# Patient Record
Sex: FEMALE | Race: WHITE | Employment: UNEMPLOYED | URBAN - METROPOLITAN AREA
[De-identification: names, ages, dates, MRNs, and addresses within clinical notes are randomized per-mention and may not be internally consistent; named-entity substitution may affect disease eponyms.]

---

## 2018-04-09 PROBLEM — F32.1 MODERATE MAJOR DEPRESSION (HCC): Status: ACTIVE | Noted: 2018-04-09

## 2019-05-04 ENCOUNTER — HOSPITAL ENCOUNTER (OUTPATIENT)
Dept: MAMMOGRAPHY | Age: 49
Discharge: HOME | End: 2019-05-04
Attending: FAMILY MEDICINE
Payer: COMMERCIAL

## 2019-05-04 DIAGNOSIS — Z12.31 ENCOUNTER FOR SCREENING MAMMOGRAM FOR BREAST CANCER: ICD-10-CM

## 2019-05-04 PROCEDURE — 77067 SCR MAMMO BI INCL CAD: CPT

## 2019-06-27 PROBLEM — F51.05 INSOMNIA DUE TO OTHER MENTAL DISORDER: Status: ACTIVE | Noted: 2019-06-27

## 2019-06-27 PROBLEM — F99 INSOMNIA DUE TO OTHER MENTAL DISORDER: Status: ACTIVE | Noted: 2019-06-27

## 2019-07-18 PROBLEM — E87.6 HYPOKALEMIA: Status: ACTIVE | Noted: 2019-07-18

## 2019-07-18 PROBLEM — R10.11 COLICKY RUQ ABDOMINAL PAIN: Status: ACTIVE | Noted: 2019-07-18

## 2019-07-18 PROBLEM — R10.9 ACUTE RIGHT FLANK PAIN: Status: ACTIVE | Noted: 2019-07-18

## 2019-08-07 PROBLEM — K76.0 NONALCOHOLIC HEPATOSTEATOSIS: Status: ACTIVE | Noted: 2019-08-07

## 2019-09-24 ENCOUNTER — HOSPITAL ENCOUNTER (OUTPATIENT)
Dept: SURGERY | Age: 49
Discharge: HOME | End: 2019-09-24

## 2019-09-24 VITALS — BODY MASS INDEX: 25.69 KG/M2 | WEIGHT: 145 LBS | HEIGHT: 63 IN

## 2019-09-24 NOTE — PERIOP NOTES
Patient verified name and . Order for consent found in EHR and matches case posting; patient verifies procedure. Type 2 surgery, abbreviated phone assessment complete- very difficult to assess pt's history- pt's thoughts very scattered and slurring words. Pt seemed concerned about the Dr Elby Bernheim show that she was watching and her current supplements that she is taking. Pt stated that she had \"a lot\" going on in her life right now and she wasn't sure she \"could handle the surgery. \" Pt informed that her own health was important and that if she had any questions or concerns about her surgery or about her health, she needed to call the surgeon's office- pt given surgeon's office # and advised to call if she thinks she wants to cancel surgery. The pt kept asking if I \"recommended\" she not have surgery and I refused to give her an answer- I kept referring the patient to call the surgeon's office but I did inform the patient that the surgeon scheduled her for surgery so that was his recommendation at that time. Orders received. Labs per surgeon: none  Labs per anesthesia protocol: no hgb needed per Dr Tim Garcia' anesthesia protocol    Patient answered medical/surgical history questions at their best of ability. All prior to admission medications documented in [facility]. Patient instructed to take the following medications the day of surgery according to anesthesia guidelines with a small sip of water: xanax, synthroid, metoprolol, endocet, phenergan PRN, crestor. Hold all vitamins 7 days prior to surgery and NSAIDS 5 days prior to surgery. Prescription meds to hold: Rx vitamins DOS.      Patient instructed on the following:  Arrive at A Entrance, time of arrival to be called the day before by 1700  NPO after midnight including gum, mints, and ice chips  Responsible adult must drive patient to the hospital, stay during surgery, and patient will need supervision 24 hours after anesthesia  Use antibacterial soap in shower the night before surgery and on the morning of surgery  All piercings must be removed prior to arrival.    Leave all valuables (money and jewelry) at home but bring insurance card and ID on DOS. Do not wear make-up, nail polish, lotions, cologne, perfumes, powders, or oil on skin. Patient teach back successful and patient demonstrates knowledge of instruction.

## 2019-10-09 PROBLEM — K81.1 CHOLECYSTITIS, CHRONIC: Status: ACTIVE | Noted: 2019-10-09

## 2019-10-22 ENCOUNTER — HOSPITAL ENCOUNTER (OUTPATIENT)
Dept: SURGERY | Age: 49
Discharge: HOME | End: 2019-10-22

## 2019-10-25 VITALS — BODY MASS INDEX: 25.69 KG/M2 | HEIGHT: 63 IN | WEIGHT: 145 LBS

## 2019-10-25 NOTE — PERIOP NOTES
Patient verified name and . Order for consent NOT found in EHR and matches case posting; patient verifies procedure. Type 2 surgery, PAT phone assessment complete. Orders NOT received. Labs per surgeon: No orders received at this time. Labs per anesthesia protocol: no hgb required by Dr. Kolton Hurd' anesthesia protocol. Patient was hospitalized 19 through 19 in [place] for seizures secondary to benzodiazepine withdrawal. Patient f/u with PCP, Dr. Madonna Alicia, on 10/9/19. Anesthesia to review PCP note and determine if any additional f/u is needed prior to surgery. Charge nurse to f/u. Patient answered medical/surgical history questions at their best of ability. All prior to admission medications documented in [facility]. Patient instructed to take the following medications the day of surgery according to anesthesia guidelines with a small sip of water: Xanax, Synthroid, Metoprolol, Endocet, and Phenergan PRN. Hold all non-prescribed vitamins/supplements/herbals 7 days prior to surgery and NSAIDS 5 days prior to surgery. Prescription meds to hold: Rx vitamins DOS. Patient instructed on the following:  Arrive at [facility], time of arrival to be called the day before by 1700  NPO after midnight including gum, mints, and ice chips  Responsible adult must drive patient to the hospital, stay during surgery, and patient will need supervision 24 hours after anesthesia  Use anti-bacterial soap in shower the night before surgery and on the morning of surgery  All piercings must be removed prior to arrival.    Leave all valuables (money and jewelry) at home but bring insurance card and ID on       DOS. Do not wear make-up, nail polish, lotions, cologne, perfumes, powders, or oil on skin. Patient teach back successful and patient demonstrates knowledge of instruction.

## 2019-10-28 NOTE — PERIOP NOTES
Called Dr. Sha Aleman (Anesthesiologist). Informed of pt's seizure 9/28/19 due to benzodiazepine (xanax) withdrawal. Informed that pt has had f/u with PCP and is currently taking xanax. Per Dr. Sha Aleman, ok for pt to proceed with surgery. Complete PCP note dated 10/9/19 on chart for reference.

## 2019-10-29 ENCOUNTER — ANESTHESIA (OUTPATIENT)
Dept: SURGERY | Age: 49
End: 2019-10-29
Payer: COMMERCIAL

## 2019-10-29 ENCOUNTER — ANESTHESIA EVENT (OUTPATIENT)
Dept: SURGERY | Age: 49
End: 2019-10-29
Payer: COMMERCIAL

## 2019-10-29 ENCOUNTER — HOSPITAL ENCOUNTER (OUTPATIENT)
Age: 49
Setting detail: OUTPATIENT SURGERY
Discharge: HOME | End: 2019-10-29
Attending: SURGERY | Admitting: SURGERY
Payer: COMMERCIAL

## 2019-10-29 VITALS
TEMPERATURE: 97.3 F | RESPIRATION RATE: 16 BRPM | HEART RATE: 82 BPM | BODY MASS INDEX: 25.66 KG/M2 | DIASTOLIC BLOOD PRESSURE: 68 MMHG | OXYGEN SATURATION: 94 % | HEIGHT: 63 IN | SYSTOLIC BLOOD PRESSURE: 162 MMHG | WEIGHT: 144.8 LBS

## 2019-10-29 DIAGNOSIS — K81.1 CHOLECYSTITIS, CHRONIC: Primary | ICD-10-CM

## 2019-10-29 LAB — HCG UR QL: NEGATIVE

## 2019-10-29 PROCEDURE — 77030037088 HC TUBE ENDOTRACH ORAL NSL COVD-A: Performed by: ANESTHESIOLOGY

## 2019-10-29 PROCEDURE — 77030039425 HC BLD LARYNG TRULITE DISP TELE -A: Performed by: ANESTHESIOLOGY

## 2019-10-29 PROCEDURE — 74011250636 HC RX REV CODE- 250/636: Performed by: NURSE ANESTHETIST, CERTIFIED REGISTERED

## 2019-10-29 PROCEDURE — 74011000250 HC RX REV CODE- 250: Performed by: NURSE ANESTHETIST, CERTIFIED REGISTERED

## 2019-10-29 PROCEDURE — 77030034154 HC SHR COAG HARM ACE J&J -F: Performed by: SURGERY

## 2019-10-29 PROCEDURE — 74011250636 HC RX REV CODE- 250/636: Performed by: ANESTHESIOLOGY

## 2019-10-29 PROCEDURE — 74011250637 HC RX REV CODE- 250/637: Performed by: ANESTHESIOLOGY

## 2019-10-29 PROCEDURE — 74011000250 HC RX REV CODE- 250: Performed by: SURGERY

## 2019-10-29 PROCEDURE — 88304 TISSUE EXAM BY PATHOLOGIST: CPT

## 2019-10-29 PROCEDURE — 76210000016 HC OR PH I REC 1 TO 1.5 HR: Performed by: SURGERY

## 2019-10-29 PROCEDURE — 76210000020 HC REC RM PH II FIRST 0.5 HR: Performed by: SURGERY

## 2019-10-29 PROCEDURE — 81025 URINE PREGNANCY TEST: CPT

## 2019-10-29 PROCEDURE — 74011250636 HC RX REV CODE- 250/636: Performed by: SURGERY

## 2019-10-29 PROCEDURE — 77030008522 HC TBNG INSUF LAPRO STRY -B: Performed by: SURGERY

## 2019-10-29 PROCEDURE — 77030031139 HC SUT VCRL2 J&J -A: Performed by: SURGERY

## 2019-10-29 PROCEDURE — 77030018836 HC SOL IRR NACL ICUM -A: Performed by: SURGERY

## 2019-10-29 PROCEDURE — 77030010513 HC APPL CLP LIG J&J -C: Performed by: SURGERY

## 2019-10-29 PROCEDURE — 77030019940 HC BLNKT HYPOTHRM STRY -B: Performed by: ANESTHESIOLOGY

## 2019-10-29 PROCEDURE — 74011000250 HC RX REV CODE- 250: Performed by: ANESTHESIOLOGY

## 2019-10-29 PROCEDURE — 76060000032 HC ANESTHESIA 0.5 TO 1 HR: Performed by: SURGERY

## 2019-10-29 PROCEDURE — 76010000138 HC OR TIME 0.5 TO 1 HR: Performed by: SURGERY

## 2019-10-29 RX ORDER — OXYCODONE HYDROCHLORIDE 5 MG/1
10 TABLET ORAL
Status: COMPLETED | OUTPATIENT
Start: 2019-10-29 | End: 2019-10-29

## 2019-10-29 RX ORDER — MIDAZOLAM HYDROCHLORIDE 1 MG/ML
2 INJECTION, SOLUTION INTRAMUSCULAR; INTRAVENOUS
Status: DISCONTINUED | OUTPATIENT
Start: 2019-10-29 | End: 2019-10-29 | Stop reason: HOSPADM

## 2019-10-29 RX ORDER — OXYCODONE HYDROCHLORIDE 5 MG/1
5 TABLET ORAL
Status: DISCONTINUED | OUTPATIENT
Start: 2019-10-29 | End: 2019-10-30 | Stop reason: HOSPADM

## 2019-10-29 RX ORDER — ONDANSETRON 2 MG/ML
INJECTION INTRAMUSCULAR; INTRAVENOUS AS NEEDED
Status: DISCONTINUED | OUTPATIENT
Start: 2019-10-29 | End: 2019-10-29 | Stop reason: HOSPADM

## 2019-10-29 RX ORDER — NALOXONE HYDROCHLORIDE 0.4 MG/ML
0.1 INJECTION, SOLUTION INTRAMUSCULAR; INTRAVENOUS; SUBCUTANEOUS AS NEEDED
Status: DISCONTINUED | OUTPATIENT
Start: 2019-10-29 | End: 2019-10-29 | Stop reason: HOSPADM

## 2019-10-29 RX ORDER — KETOROLAC TROMETHAMINE 30 MG/ML
INJECTION, SOLUTION INTRAMUSCULAR; INTRAVENOUS AS NEEDED
Status: DISCONTINUED | OUTPATIENT
Start: 2019-10-29 | End: 2019-10-29 | Stop reason: HOSPADM

## 2019-10-29 RX ORDER — ACETAMINOPHEN 500 MG
500 TABLET ORAL ONCE
Status: DISCONTINUED | OUTPATIENT
Start: 2019-10-29 | End: 2019-10-30 | Stop reason: HOSPADM

## 2019-10-29 RX ORDER — HYDROMORPHONE HYDROCHLORIDE 2 MG/ML
0.5 INJECTION, SOLUTION INTRAMUSCULAR; INTRAVENOUS; SUBCUTANEOUS
Status: DISCONTINUED | OUTPATIENT
Start: 2019-10-29 | End: 2019-10-30 | Stop reason: HOSPADM

## 2019-10-29 RX ORDER — ONDANSETRON 2 MG/ML
4 INJECTION INTRAMUSCULAR; INTRAVENOUS ONCE
Status: DISCONTINUED | OUTPATIENT
Start: 2019-10-29 | End: 2019-10-30 | Stop reason: HOSPADM

## 2019-10-29 RX ORDER — ROCURONIUM BROMIDE 10 MG/ML
INJECTION, SOLUTION INTRAVENOUS AS NEEDED
Status: DISCONTINUED | OUTPATIENT
Start: 2019-10-29 | End: 2019-10-29 | Stop reason: HOSPADM

## 2019-10-29 RX ORDER — FENTANYL CITRATE 50 UG/ML
100 INJECTION, SOLUTION INTRAMUSCULAR; INTRAVENOUS AS NEEDED
Status: CANCELLED | OUTPATIENT
Start: 2019-10-29

## 2019-10-29 RX ORDER — SODIUM CHLORIDE, SODIUM LACTATE, POTASSIUM CHLORIDE, CALCIUM CHLORIDE 600; 310; 30; 20 MG/100ML; MG/100ML; MG/100ML; MG/100ML
75 INJECTION, SOLUTION INTRAVENOUS CONTINUOUS
Status: DISCONTINUED | OUTPATIENT
Start: 2019-10-29 | End: 2019-10-30 | Stop reason: HOSPADM

## 2019-10-29 RX ORDER — CEFAZOLIN SODIUM 1 G/3ML
2-3 INJECTION, POWDER, FOR SOLUTION INTRAMUSCULAR; INTRAVENOUS
Status: CANCELLED | OUTPATIENT
Start: 2019-10-29 | End: 2019-10-29

## 2019-10-29 RX ORDER — DEXAMETHASONE SODIUM PHOSPHATE 4 MG/ML
INJECTION, SOLUTION INTRA-ARTICULAR; INTRALESIONAL; INTRAMUSCULAR; INTRAVENOUS; SOFT TISSUE AS NEEDED
Status: DISCONTINUED | OUTPATIENT
Start: 2019-10-29 | End: 2019-10-29 | Stop reason: HOSPADM

## 2019-10-29 RX ORDER — ONDANSETRON HYDROCHLORIDE 8 MG/1
8 TABLET, FILM COATED ORAL
Qty: 10 TAB | Refills: 0 | Status: SHIPPED | OUTPATIENT
Start: 2019-10-29

## 2019-10-29 RX ORDER — HYDROCODONE BITARTRATE AND ACETAMINOPHEN 5; 325 MG/1; MG/1
TABLET ORAL
Qty: 20 TAB | Refills: 0 | Status: SHIPPED | OUTPATIENT
Start: 2019-10-29 | End: 2019-11-05

## 2019-10-29 RX ORDER — FENTANYL CITRATE 50 UG/ML
INJECTION, SOLUTION INTRAMUSCULAR; INTRAVENOUS AS NEEDED
Status: DISCONTINUED | OUTPATIENT
Start: 2019-10-29 | End: 2019-10-29 | Stop reason: HOSPADM

## 2019-10-29 RX ORDER — CEFAZOLIN SODIUM/WATER 2 G/20 ML
2 SYRINGE (ML) INTRAVENOUS ONCE
Status: COMPLETED | OUTPATIENT
Start: 2019-10-29 | End: 2019-10-29

## 2019-10-29 RX ORDER — PROPOFOL 10 MG/ML
INJECTION, EMULSION INTRAVENOUS AS NEEDED
Status: DISCONTINUED | OUTPATIENT
Start: 2019-10-29 | End: 2019-10-29 | Stop reason: HOSPADM

## 2019-10-29 RX ORDER — DIPHENHYDRAMINE HYDROCHLORIDE 50 MG/ML
12.5 INJECTION, SOLUTION INTRAMUSCULAR; INTRAVENOUS ONCE
Status: DISCONTINUED | OUTPATIENT
Start: 2019-10-29 | End: 2019-10-30 | Stop reason: HOSPADM

## 2019-10-29 RX ORDER — LIDOCAINE HYDROCHLORIDE 20 MG/ML
INJECTION, SOLUTION EPIDURAL; INFILTRATION; INTRACAUDAL; PERINEURAL AS NEEDED
Status: DISCONTINUED | OUTPATIENT
Start: 2019-10-29 | End: 2019-10-29 | Stop reason: HOSPADM

## 2019-10-29 RX ORDER — BUPIVACAINE HYDROCHLORIDE AND EPINEPHRINE 2.5; 5 MG/ML; UG/ML
INJECTION, SOLUTION EPIDURAL; INFILTRATION; INTRACAUDAL; PERINEURAL AS NEEDED
Status: DISCONTINUED | OUTPATIENT
Start: 2019-10-29 | End: 2019-10-29 | Stop reason: HOSPADM

## 2019-10-29 RX ORDER — LIDOCAINE HYDROCHLORIDE 10 MG/ML
0.1 INJECTION INFILTRATION; PERINEURAL AS NEEDED
Status: DISCONTINUED | OUTPATIENT
Start: 2019-10-29 | End: 2019-10-29 | Stop reason: HOSPADM

## 2019-10-29 RX ORDER — SODIUM CHLORIDE, SODIUM LACTATE, POTASSIUM CHLORIDE, CALCIUM CHLORIDE 600; 310; 30; 20 MG/100ML; MG/100ML; MG/100ML; MG/100ML
1000 INJECTION, SOLUTION INTRAVENOUS CONTINUOUS
Status: DISCONTINUED | OUTPATIENT
Start: 2019-10-29 | End: 2019-10-29 | Stop reason: HOSPADM

## 2019-10-29 RX ADMIN — DEXAMETHASONE SODIUM PHOSPHATE 5 MG: 4 INJECTION, SOLUTION INTRAMUSCULAR; INTRAVENOUS at 14:54

## 2019-10-29 RX ADMIN — FENTANYL CITRATE 100 MCG: 50 INJECTION INTRAMUSCULAR; INTRAVENOUS at 14:32

## 2019-10-29 RX ADMIN — FENTANYL CITRATE 50 MCG: 50 INJECTION INTRAMUSCULAR; INTRAVENOUS at 14:52

## 2019-10-29 RX ADMIN — Medication 2 G: at 14:35

## 2019-10-29 RX ADMIN — LIDOCAINE HYDROCHLORIDE 0.1 ML: 10 INJECTION, SOLUTION INFILTRATION; PERINEURAL at 13:01

## 2019-10-29 RX ADMIN — LIDOCAINE HYDROCHLORIDE 80 MG: 20 INJECTION, SOLUTION EPIDURAL; INFILTRATION; INTRACAUDAL; PERINEURAL at 14:32

## 2019-10-29 RX ADMIN — FENTANYL CITRATE 50 MCG: 50 INJECTION INTRAMUSCULAR; INTRAVENOUS at 14:53

## 2019-10-29 RX ADMIN — KETOROLAC TROMETHAMINE 30 MG: 30 INJECTION, SOLUTION INTRAMUSCULAR; INTRAVENOUS at 14:59

## 2019-10-29 RX ADMIN — SUGAMMADEX 200 MG: 100 INJECTION, SOLUTION INTRAVENOUS at 15:07

## 2019-10-29 RX ADMIN — PROPOFOL 200 MG: 10 INJECTION, EMULSION INTRAVENOUS at 14:32

## 2019-10-29 RX ADMIN — ROCURONIUM BROMIDE 30 MG: 10 INJECTION, SOLUTION INTRAVENOUS at 14:32

## 2019-10-29 RX ADMIN — ONDANSETRON 4 MG: 2 INJECTION INTRAMUSCULAR; INTRAVENOUS at 14:54

## 2019-10-29 RX ADMIN — OXYCODONE HYDROCHLORIDE 10 MG: 5 TABLET ORAL at 16:00

## 2019-10-29 RX ADMIN — SODIUM CHLORIDE, SODIUM LACTATE, POTASSIUM CHLORIDE, AND CALCIUM CHLORIDE 1000 ML: 600; 310; 30; 20 INJECTION, SOLUTION INTRAVENOUS at 13:01

## 2019-10-29 NOTE — ANESTHESIA POSTPROCEDURE EVALUATION
Procedure(s):  CHOLECYSTECTOMY LAPAROSCOPIC SINGLE INCISION (SILS).     general    Anesthesia Post Evaluation      Multimodal analgesia: multimodal analgesia used between 6 hours prior to anesthesia start to PACU discharge  Patient location during evaluation: PACU  Patient participation: complete - patient participated  Level of consciousness: awake and alert  Pain management: adequate  Airway patency: patent  Anesthetic complications: no  Cardiovascular status: acceptable  Respiratory status: acceptable  Hydration status: acceptable  Post anesthesia nausea and vomiting:  none      Vitals Value Taken Time   /70 10/29/2019  4:03 PM   Temp 36.3 °C (97.3 °F) 10/29/2019  3:18 PM   Pulse 81 10/29/2019  4:03 PM   Resp 16 10/29/2019  4:03 PM   SpO2 93 % 10/29/2019  4:03 PM

## 2019-10-29 NOTE — ANESTHESIA PREPROCEDURE EVALUATION
Anesthetic History   No history of anesthetic complications            Review of Systems / Medical History  Patient summary reviewed, nursing notes reviewed and pertinent labs reviewed    Pulmonary          Smoker         Neuro/Psych         Psychiatric history (anxiety/depression)     Cardiovascular    Hypertension: well controlled              Exercise tolerance: >4 METS     GI/Hepatic/Renal           Liver disease     Endo/Other      Hypothyroidism: well controlled       Other Findings   Comments: Grave's disease, treated with radioablation, on Synthroid           Physical Exam    Airway  Mallampati: III  TM Distance: 4 - 6 cm  Neck ROM: normal range of motion   Mouth opening: Diminished (comment)     Cardiovascular    Rhythm: regular  Rate: normal         Dental         Pulmonary  Breath sounds clear to auscultation               Abdominal         Other Findings            Anesthetic Plan    ASA: 3  Anesthesia type: general          Induction: Intravenous  Anesthetic plan and risks discussed with: Patient

## 2019-10-29 NOTE — OP NOTES
Operative Report    Patient: Rema Moore MRN: [id number]      YOB: 1970  Age: 52 y.o. Sex: female       Date of Surgery: 10/29/2019     Preoperative Diagnosis: Biliary dyskinesia [K82.8]     Postoperative Diagnosis: Biliary dyskinesia [K82.8     Procedure:  Laparoscopic Cholecystectomy -Single Incision/SILS    Anesthesia: General   Complications: none  Estimated Blood Loss: per anesthesia    Indications:  As outlined in the History and Physical.  Single incision technique is planned to provide the patient with the benefit of less incisional pain and improved cosmesis due to fewer incisions as well as the potential for lower risk of wound infection. This technique is significantly more intricate than standard laparoscopic techniques and typically increases the complexity of the procedure by 10-20%. Procedure in Detail:   Informed consent was obtained and the patient was brought to the operating room and placed on the table in supine position with adequate padding of all pressure points and compression devices on both lower extremities. After the successful induction of general anesthesia, the patients abdomen was prepped and draped sterilely. Under direct laparoscopic visualization and additional local anesthetic, a 5mm optiview-type Trocar was inserted through a curved infraumbilical  incision and pneumoperitoneum was created. Visual exploration revealed no immediately apparent pathology. An additional 5mm Trocar was placed through the incision  and a [place] grasper was inserted alongside that trocar sleeve. The visibly normal gallbladder was grasped by its neck and retracted anterolaterally. The tissues investing this area were incised and blunt dissection was used to skeletonize the normal caliber cystic duct for approximately 10mm.  A generous window was created behind the duct allowing identification of the cystic artery, which was traced to the gallbladder wall and divided with the harmonic scalpel. This permitted  generation of a large window behind the neck of the gallbladder allowing a near 360 degree view of the region to confirm normal anatomy of the origin of the cystic duct. A clip was then placed across the neck of the gallbladder and two on the duct 0.5 cm medially and it was transected. The gallbladder was excised with the harmonic and after confirming hemostasis of the liver bed, it was extracted through the umbilical site. Pneumoperitoneum was released and the trocars were removed. The fascia was closed with a figure-of-eight 0-Vicryl,  then the incision was  made hemostatic with cautery and closed with subcuticular 4-0 Vicryl and Steri-Strips were applied. The patient tolerated the procedure well. There were no immediate apparent complications. She was awakened from anesthesia and extubated in the operating room, taken to recovery in satisfactory condition. Specimens:   ID Type Source Tests Collected by Time Destination   1 : gallbladder Preservative Gallbladder  Lonniebhavya Pak MD 10/29/2019 9436 Pathology           Counts: Sponge and needle counts were correct.     Signed By:  Candace New MD     October 29, 2019

## 2019-10-29 NOTE — DISCHARGE INSTRUCTIONS
Monica Todd M.D.  [phone]    Instructions following Laparoscopic Cholecystectomy:    ACTIVITY:   Try to take a few short walks with help around the house later today. It is very important to take short walks to avoid blood clots and pneumonia.  You may be light-headed or sleepy from anesthesia, so be careful going up and down stairs.  Avoid any activity that may be dangerous or involves heavy objects for 24-48 hours. DIET:   Drink only clear, non-carbonated liquids when you get home (sugar-free if you are diabetic), such as Gatorade, chicken broth, etc.   Tomorrow start soft foods such as grits and eggs, mashed potatoes, yogurt, cottage cheese, etc. Remain on soft foods for at least 24-36 hours then you may eat whatever foods you wish.  Many people experience nausea for a day or so after surgery, and many people have loose stools or diarrhea immediately after gallbladder surgery. It is also not uncommon to not have a bowel movement for 2-3 days. PAIN:   You will be given a prescription for pain medication and nausea.  Try to take the pain medication with food, even a few crackers.  You may also use Tylenol, Motrin, Advil, or Aleve instead of the prescription pain medication. Do no take Tylenol and the prescription pain medication within 4 hours of each other.  URINARY RETENTION: If you are unable to empty your bladder within 6 hours after returning home, please go to your nearest Emergency Department or Urgent Care for urinary catheterization. WOUND CARE:   You may shower the day after surgery   It is not uncommon for the incisions to ooze or drain blood-tinged fluid. Cover the area with gauze if the drainage continues.  Incisions will sometimes develop redness around them, up to the size of a quarter, as well as a hard lumpy feel. If this redness continues to get larger, please call the office.     PLEASE NOTE: it is not uncommon to have pain and a \"knot\" to the left of the belly button. CALL THE DOCTOR IF:   You have a temperature higher than 101.5° Fahrenheit for more than 6 hours.  You have severe nausea or vomiting not relieved by medication; or diarrhea.  You develop increasing redness or infection at the incision. Continue home medications as previously prescribed.

## 2019-10-29 NOTE — H&P
Primary/Requesting provider: Cayden Berry MD          Chief Complaint   Patient presents with    New Patient       cholecystitis          From 9/11 office visit:     Patient is a 50 y.o. female who presents for discussion of her GI symptoms, and consideration of cholecystectomy. She is here with her  who does contribute to HPI. She reports GI symptoms dating back to the age of 6, diagnosed as a \"nervous stomach. 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 \"  This was in reference to trying to present the whole picture of her symptoms. She is a very nonspecific historian and is unable to produce anything close to specifics with her symptoms. However, it sounds like on top of her lifelong symptoms, she has had an escalation of several issues since May. This seems to have been triggered by what she believed (and still believes) to be a muscle injury in her right lateral chest wall when she bent over and pulled her dog's leash, experiencing an immediate pain/twinge. Her  add that she seems to have more RUQ pain after eating. She endorses post-prandial diarrhea, but only if she has been particularly anxious or worried. She endorses post-prandial nausea, without vomiting, for which she chronically takes Phenergan. She endorses postprandial lower abdominal pain and begrudgingly endorses that it can also be in the RUQ. She has altered her diet to avoid spicy foods, fried foods, and onions (which, on discussion, is probably the former 2 foods). She does not have fevers, melena, hematochezia, unexplained weight loss.      Medications:        Current Outpatient Medications   Medication Sig    oxyCODONE-acetaminophen (ENDOCET)  mg per tablet Take 1 Tab by mouth four (4) times daily as needed for Pain for up to 30 days.  Max Daily Amount: 4 Tabs.  rosuvastatin (CRESTOR) 10 mg tablet Take 1 Tab by mouth daily. Indications: high amount of triglyceride in the blood, combined high blood cholesterol and triglyceride level    promethazine (PHENERGAN) 25 mg tablet TAKE ONE TABLET BY MOUTH EVERY 6 HOURS AS NEEDED FOR NAUSEA    levothyroxine (SYNTHROID) 75 mcg tablet Take 1 Tab by mouth Daily (before breakfast).  ALPRAZolam (XANAX) 2 mg tablet Take 1 Tab by mouth four (4) times daily as needed for Anxiety. Indications: Anxiousness associated with Depression    metoprolol tartrate (LOPRESSOR) 50 mg tablet Take 0.5 Tabs by mouth two (2) times a day.  potassium chloride (K-DUR, KLOR-CON) 20 mEq tablet Take 1 Tab by mouth daily.  Cholecalciferol, Vitamin D3, (VITAMIN D3) 1,000 unit cap Take 1,000 Units by mouth daily.  traZODone (DESYREL) 150 mg tablet        No current facility-administered medications for this visit.          Allergies:    Allergies   Allergen Reactions    Haldol [Haloperidol Lactate] Seizures         Past History:       Past Medical History:   Diagnosis Date    Anxiety       managed with medication    Cigarette smoker      Depression       managed with medication    Graves disease       \"I had radioactive iodine to kill my thyroid\"     Hypertension       managed with medication    Hypothyroidism       managed with medication    Insomnia       managed with medication    Irregular menses      Menopause      Nicotine vapor product user       6% nicotine    Poor historian      Vitamin D deficiency       managed with daily vitamin D supplement            Past Surgical History:   Procedure Laterality Date    HX DILATION AND CURETTAGE   07/2016         Family and Social History:        Family History   Problem Relation Age of Onset    Heart Disease Mother      Lung Disease Mother      Breast Cancer Neg Hx        Social History            Socioeconomic History    Marital status:        Spouse name: Not on file    Number of children: Not on file    Years of education: Not on file    Highest education level: Not on file   Occupational History    Not on file   Social Needs    Financial resource strain: Not on file    Food insecurity:       Worry: Not on file       Inability: Not on file    Transportation needs:       Medical: Not on file       Non-medical: Not on file   Tobacco Use    Smoking status: Current Every Day Smoker       Packs/day: 1.00       Years: 30.00       Pack years: 30.00       Types: Cigarettes    Smokeless tobacco: Never Used    Tobacco comment: patient also uses vapor cigs    Substance and Sexual Activity    Alcohol use: No       Alcohol/week: 0.0 standard drinks    Drug use: No    Sexual activity: Yes       Partners: Male   Lifestyle    Physical activity:       Days per week: Not on file       Minutes per session: Not on file    Stress: Not on file   Relationships    Social connections:       Talks on phone: Not on file       Gets together: Not on file       Attends Hinduism service: Not on file       Active member of club or organization: Not on file       Attends meetings of clubs or organizations: Not on file       Relationship status: Not on file    Intimate partner violence:       Fear of current or ex partner: Not on file       Emotionally abused: Not on file       Physically abused: Not on file       Forced sexual activity: Not on file   Other Topics Concern    Not on file   Social History Narrative    Not on file       Review of Systems   Constitutional: Negative. Eyes: Negative. Respiratory: Negative. Cardiovascular: Negative. Gastrointestinal: Positive for abdominal pain, diarrhea, heartburn, nausea and vomiting. Genitourinary: Negative. Musculoskeletal: Negative. Skin: Negative. Neurological: Negative. Endo/Heme/Allergies: Negative.     Psychiatric/Behavioral: Negative.          Physical Exam   Constitutional: She appears well-developed and well-nourished. She is cooperative. Non-toxic appearance. HENT:   Head: Normocephalic and atraumatic. Mouth/Throat: Oropharynx is clear and moist.   Eyes: Pupils are equal, round, and reactive to light. Conjunctivae and EOM are normal. No scleral icterus. Neck: Normal range of motion. No JVD present. No tracheal deviation present. No thyromegaly present. Cardiovascular: Normal rate, regular rhythm and normal heart sounds. Exam reveals no gallop and no friction rub. No murmur heard. Pulmonary/Chest: Effort normal and breath sounds normal. No respiratory distress. She has no wheezes. She has no rales. Abdominal: Soft. Bowel sounds are normal. She exhibits no distension. There is tenderness. There is guarding (in the RUQ and epigastrum). There is no rebound. Musculoskeletal:   No gross deformities   Neurological: She is alert. No cranial nerve deficit. Skin: Skin is warm. She is not diaphoretic. Psychiatric: Judgment and thought content normal.   Somewhat aloof at times, but also anxious. Tearful at times. Vitals reviewed.     DATA:  Outside imaging reports reveal normal CT scan 7/28/19. HIDA scan revealed GBEF=23% on 8/23- she is unable to recall any symptom reproduction     ASSESSMENT and PLAN       Encounter Diagnoses   Name Primary?  Biliary dyskinesia Yes    Chronic abdominal pain      Nervous stomach        Patient is virtually unable to give any specific details of her current symptoms. However, it sounds as though she has had IBS, as well as probable anxiety disorder, chronically but may have recently experienced worsening of symptoms consistent with biliary dyskinesia. She and her  are counseled quite frankly that it would seem quite UNLIKELY that she would experience complete resolution of her 40+ years of GI symptoms.   However, it also seems reasonable to expect some improvement in symptoms- possibly less nausea and diarrhea and upper abdominal pain- with cholecystectomy. This was reiterated in multiple ways to help her come to terms with the recommendation for cholecystectomy.     After a protracted discussion and counseling regarding the above (>50% of this >60min appt), she will proceed with cholecystectomy. Technical details of the procedure are reviewed, including the non-standard single incision (SILS) technique if indicated. Risks reviewed include anesthetic risks, bleeding, infection, visceral injury including bile leak, incomplete symptom resolution and persistent post-operative diarrhea as well as conversion to open technique. All questions are answered. Surgery delayed by hospitalization end of September for seizures due to benzodiazepine withdrawal (per PCP notes).

## 2020-02-17 PROBLEM — N20.0 RENAL STONE: Status: ACTIVE | Noted: 2020-02-17

## 2020-02-17 PROBLEM — N13.8 BPH WITH URINARY OBSTRUCTION: Status: ACTIVE | Noted: 2020-02-17

## 2020-03-18 PROBLEM — Z12.5 SCREENING FOR PROSTATE CANCER: Status: RESOLVED | Noted: 2020-02-17 | Resolved: 2020-03-18

## (undated) DEVICE — [HIGH FLOW INSUFFLATOR,  DO NOT USE IF PACKAGE IS DAMAGED,  KEEP DRY,  KEEP AWAY FROM SUNLIGHT,  PROTECT FROM HEAT AND RADIOACTIVE SOURCES.]: Brand: PNEUMOSURE

## (undated) DEVICE — REM POLYHESIVE ADULT PATIENT RETURN ELECTRODE: Brand: VALLEYLAB

## (undated) DEVICE — (D)PREP SKN CHLRAPRP APPL 26ML -- CONVERT TO ITEM 371833

## (undated) DEVICE — NEEDLE HYPO 21GA L1.5IN INTRAMUSCULAR S STL LATCH BVL UP

## (undated) DEVICE — GENERAL LAPAROSCOPY: Brand: MEDLINE INDUSTRIES, INC.

## (undated) DEVICE — SHEARS ENDOSCP L36CM DIA5MM ULTRASONIC CRV TIP W/ ADV

## (undated) DEVICE — SOLUTION IV 1000ML 0.9% SOD CHL

## (undated) DEVICE — STRIP,CLOSURE,WOUND,MEDI-STRIP,1/2X4: Brand: MEDLINE

## (undated) DEVICE — SYR LR LCK 1ML GRAD NSAF 30ML --

## (undated) DEVICE — BUTTON SWITCH PENCIL BLADE ELECTRODE, HOLSTER: Brand: EDGE

## (undated) DEVICE — SUTURE VCRL SZ 4-0 L27IN ABSRB UD L19MM PS-2 3/8 CIR PRIM J426H

## (undated) DEVICE — SUTURE VCRL SZ 0 L27IN ABSRB UD L26MM CT-2 1/2 CIR J270H

## (undated) DEVICE — KIT,ANTI FOG,W/SPONGE & FLUID,SOFT PACK: Brand: MEDLINE

## (undated) DEVICE — APPLIER CLP M/L SHFT DIA5MM 15 LIG LIGAMAX 5

## (undated) DEVICE — GOWN,REINF,POLY,ECL,PP SLV,XL: Brand: MEDLINE

## (undated) DEVICE — MASTISOL ADHESIVE LIQ 2/3ML

## (undated) DEVICE — CONTAINER SPEC FRMLN 120ML --